# Patient Record
Sex: FEMALE
[De-identification: names, ages, dates, MRNs, and addresses within clinical notes are randomized per-mention and may not be internally consistent; named-entity substitution may affect disease eponyms.]

---

## 2024-03-12 PROBLEM — Z00.00 ENCOUNTER FOR PREVENTIVE HEALTH EXAMINATION: Status: ACTIVE | Noted: 2024-03-12

## 2024-03-18 ENCOUNTER — APPOINTMENT (OUTPATIENT)
Dept: ENDOCRINOLOGY | Facility: CLINIC | Age: 49
End: 2024-03-18
Payer: COMMERCIAL

## 2024-03-18 VITALS
OXYGEN SATURATION: 100 % | BODY MASS INDEX: 38.58 KG/M2 | HEART RATE: 69 BPM | RESPIRATION RATE: 16 BRPM | WEIGHT: 226 LBS | TEMPERATURE: 97.2 F | DIASTOLIC BLOOD PRESSURE: 83 MMHG | HEIGHT: 64 IN | SYSTOLIC BLOOD PRESSURE: 143 MMHG

## 2024-03-18 DIAGNOSIS — Z82.49 FAMILY HISTORY OF ISCHEMIC HEART DISEASE AND OTHER DISEASES OF THE CIRCULATORY SYSTEM: ICD-10-CM

## 2024-03-18 DIAGNOSIS — E66.9 OBESITY, UNSPECIFIED: ICD-10-CM

## 2024-03-18 DIAGNOSIS — R73.03 PREDIABETES.: ICD-10-CM

## 2024-03-18 DIAGNOSIS — Z86.79 PERSONAL HISTORY OF OTHER DISEASES OF THE CIRCULATORY SYSTEM: ICD-10-CM

## 2024-03-18 DIAGNOSIS — R63.5 ABNORMAL WEIGHT GAIN: ICD-10-CM

## 2024-03-18 DIAGNOSIS — Z83.3 FAMILY HISTORY OF DIABETES MELLITUS: ICD-10-CM

## 2024-03-18 DIAGNOSIS — I10 ESSENTIAL (PRIMARY) HYPERTENSION: ICD-10-CM

## 2024-03-18 PROCEDURE — 99205 OFFICE O/P NEW HI 60 MIN: CPT

## 2024-03-18 NOTE — REASON FOR VISIT
[Initial Evaluation] : an initial evaluation [[employer] ] : provided by [employer]   [Weight Management/Obesity] : weight management/obesity [Interpreters_IDNumber] : [id number] [Interpreters_FullName] : Melina [TWNoteComboBox1] : Ivorian

## 2024-03-18 NOTE — ASSESSMENT
[Weight Loss] : weight loss [FreeTextEntry1] : Obesity Class 2 approaching class 3 difficulty losing weight despite reported small portions  No clinical evidence of Cushings No hx of thyroid disease reported No lab work presented with referral. Caloric deficit recommended for weight loss along with increased activity Lab work to assess for contributing factors GLP-1 agonist candidate vs Bariatric evaluation   History of Prediabetes: unsure of last A1c will recheck today   HTN: reports history of HTN but not on any medications States has been in control elevated in office today 143/83 DASH diet and increased activity  f/u PCP

## 2024-03-18 NOTE — PHYSICAL EXAM
[Alert] : alert [Well Nourished] : well nourished [Obese] : obese [No Acute Distress] : no acute distress [Well Developed] : well developed [Normal Sclera/Conjunctiva] : normal sclera/conjunctiva [EOMI] : extra ocular movement intact [No Proptosis] : no proptosis [Normal Oropharynx] : the oropharynx was normal [Thyroid Not Enlarged] : the thyroid was not enlarged [No Thyroid Nodules] : no palpable thyroid nodules [No Respiratory Distress] : no respiratory distress [No Accessory Muscle Use] : no accessory muscle use [Clear to Auscultation] : lungs were clear to auscultation bilaterally [Normal S1, S2] : normal S1 and S2 [Normal Rate] : heart rate was normal [Regular Rhythm] : with a regular rhythm [No Edema] : no peripheral edema [Pedal Pulses Normal] : the pedal pulses are present [Normal Bowel Sounds] : normal bowel sounds [Not Tender] : non-tender [Not Distended] : not distended [Soft] : abdomen soft [Normal Anterior Cervical Nodes] : no anterior cervical lymphadenopathy [Normal Posterior Cervical Nodes] : no posterior cervical lymphadenopathy [No Spinal Tenderness] : no spinal tenderness [Spine Straight] : spine straight [No Stigmata of Cushings Syndrome] : no stigmata of Cushings Syndrome [Normal Gait] : normal gait [Normal Strength/Tone] : muscle strength and tone were normal [No Rash] : no rash [Acanthosis Nigricans] : acanthosis nigricans present [Normal Reflexes] : deep tendon reflexes were 2+ and symmetric [No Tremors] : no tremors [Oriented x3] : oriented to person, place, and time

## 2024-03-18 NOTE — REVIEW OF SYSTEMS
[Fatigue] : fatigue [Recent Weight Gain (___ Lbs)] : recent weight gain: [unfilled] lbs [Heartburn] : heartburn [Hirsutism] : no hirsutism [Stress] : stress [Cold Intolerance] : cold intolerance [Hot Flashes] : hot flashes [Negative] : Heme/Lymph

## 2024-03-18 NOTE — HISTORY OF PRESENT ILLNESS
[FreeTextEntry1] : HPI: weight management    PMHx: prediabetes, HTN- not on any medications    Allergies: NKDA   ENDOCRINOPATHY Hx:   4 years cant lose weight, lost and gained,  Thyroid exam is normal  Blood work has all been normal.   This year has not weight less then 220-230 pounds.  Has been gaining weight since child birth 5 years ago s/p   Had BTL. hysterectomy   and now has only one ovary.   Night sweats, hot flashes, gets very cold  Mood and sometimes angry and irritable during - past 2 years having menopausal symptoms   Menses: was regular 5 days until later in life after having her children.  abnormal bleeding since BTL 15-20 days bleeds  2 children   LIFESTYLE FACTORS:   Eating patterns and weight history:   Activity/Sleep: Activity: doesn't exercise and states she does not have any time with her schedule. However she is always on her feet walking, moving , cleaning.    works overnight 6pm-3am wakes up 7am to take kids to school.   sleep until noon.  noon breakfast: coffee(leche, stevia), eggs, bread or reheat yesterdays food   5pm - lunch- meat, rice, salad, water, juice or soda once a week   9pm - dinner- tea only  Snacks: no snacking  Beverages:  water and pineapple infused water  Follow up care:  PCP: Brad - [address] [place] [address]        Surgical History: hysterectomy - due to hx of myoma and was anemic due to irregular bleeding.    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      Checks sugars at home at time with 's meter: 100-105 fasting

## 2024-03-20 LAB
ALBUMIN SERPL ELPH-MCNC: 4.4 G/DL
ALP BLD-CCNC: 102 U/L
ALT SERPL-CCNC: 22 U/L
ANION GAP SERPL CALC-SCNC: 11 MMOL/L
AST SERPL-CCNC: 17 U/L
BILIRUB SERPL-MCNC: <0.2 MG/DL
BUN SERPL-MCNC: 10 MG/DL
C PEPTIDE SERPL-MCNC: 9.3 NG/ML
CALCIUM SERPL-MCNC: 8.9 MG/DL
CHLORIDE SERPL-SCNC: 105 MMOL/L
CHOLEST SERPL-MCNC: 156 MG/DL
CHOLEST/HDLC SERPL: 3.5 RATIO
CO2 SERPL-SCNC: 24 MMOL/L
CORTIS SERPL-MCNC: 7.1 UG/DL
CREAT SERPL-MCNC: 0.51 MG/DL
EGFR: 117 ML/MIN/1.73M2
ESTIMATED AVERAGE GLUCOSE: 103 MG/DL
ESTRADIOL SERPL-MCNC: 31 PG/ML
FSH SERPL-MCNC: 12.7 IU/L
GLUCOSE SERPL-MCNC: 111 MG/DL
HBA1C MFR BLD HPLC: 5.2 %
HDLC SERPL-MCNC: 44 MG/DL
INSULIN P FAST SERPL-ACNC: 149 UU/ML
LDLC SERPL CALC-MCNC: 78 MG/DL
LH SERPL-ACNC: 7.1 IU/L
NONHDLC SERPL-MCNC: 111 MG/DL
POTASSIUM SERPL-SCNC: 3.7 MMOL/L
PROLACTIN SERPL-MCNC: 8.8 NG/ML
PROT SERPL-MCNC: 6.9 G/DL
SODIUM SERPL-SCNC: 140 MMOL/L
T4 FREE SERPL-MCNC: 1.1 NG/DL
TESTOST FREE SERPL-MCNC: 0.4 PG/ML
TESTOST SERPL-MCNC: <2.5 NG/DL
THYROGLOB AB SERPL-ACNC: <20 IU/ML
THYROPEROXIDASE AB SERPL IA-ACNC: <10 IU/ML
TRIGL SERPL-MCNC: 198 MG/DL
TSH SERPL-ACNC: 2.09 UIU/ML

## 2024-03-20 RX ORDER — SEMAGLUTIDE 0.25 MG/.5ML
0.25 INJECTION, SOLUTION SUBCUTANEOUS
Qty: 1 | Refills: 3 | Status: ACTIVE | COMMUNITY
Start: 2024-03-20 | End: 1900-01-01

## 2024-06-24 ENCOUNTER — APPOINTMENT (OUTPATIENT)
Dept: ENDOCRINOLOGY | Facility: CLINIC | Age: 49
End: 2024-06-24